# Patient Record
(demographics unavailable — no encounter records)

---

## 2025-07-23 NOTE — HISTORY OF PRESENT ILLNESS
[de-identified] : Miguel Berry is a 18 yo male who presents for evaluation of recurrent epistaxis. He notes that this is bilateral and frequent. He notes that each episode lasts for about 5 minutes and stops with pressure. He denies headaches or lightheadedness. He denies recent nasal trauma.  He also notes history of seasonal allergies treated with nasal sprays and oral antihistamine. He is followed by allergy and is on immunotherapy. He denies sinus pressure, rhinorrhea, postnasal drainage. However, he notes recurrent URIs.

## 2025-07-23 NOTE — PHYSICAL EXAM
[Exposed Vessel] : left anterior vessel exposed [2+] : 2+ [Clear to Auscultation] : lungs were clear to auscultation bilaterally [Wheezing] : no wheezing [Increased Work of Breathing] : no increased work of breathing with use of accessory muscles and retractions [Normal Gait and Station] : normal gait and station [Normal muscle strength, symmetry and tone of facial, head and neck musculature] : normal muscle strength, symmetry and tone of facial, head and neck musculature [Normal] : no cervical lymphadenopathy [Age Appropriate Behavior] : age appropriate behavior [Cooperative] : cooperative

## 2025-07-23 NOTE — ASSESSMENT
[FreeTextEntry1] : Miguel Berry presents for evaluation of recurrent epistaxis. HE has history of chronic rhinitis, currently on immunotherapy, and recurrent viral URIs. Sinonasal endoscopy was performed showing bilateral exposed anterior septal vessels. These were cauterized with silver nitrate. Epistaxis precautions were provided. Will refer to immunology for workup given recurrent viral URI.  Prevention of epistaxis: 1. The goal is to maintain good hydration of the lining of the nose. Dryness inside the nose is a major cause of bleeding. 2. Use 2-3 sprays of nasal saline to both sides of nose several times daily. 3. Apply small amount of antibiotic ointment to lining of the front of nose daily. 4. Avoid the use of ceiling fans or blowing air which can dry out your nose. 5. Consider use of a humidifier in the bedroom. 6. Avoid vigorous nose blowing. Cough and sneeze with your mouth open. 7. Your doctor may recommend stopping medicated nasal sprays for allergy or sinusitis, as these can sometimes worsen nosebleeds. 8. Maintain good control of your blood pressure. 9. If you are taking blood thinning medications, maintain careful follow-up with your prescribing physician to make sure these are properly dosed.  What to do if you have a nosebleed: 1. Use 2-3 sprays of topical nasal decongestant (ex: oxymetazoline, Afrin) on each side of the nose. 2. Hold firm pressure to soft part of nose for at least 5 minutes. Repeat as needed. 3. If bleeding is severe or does not resolve with these directions, seek immediate medical care.   - f/u prn

## 2025-07-23 NOTE — REVIEW OF SYSTEMS
[Seasonal Allergies] : seasonal allergies [Nasal Congestion] : nasal congestion [Nose Bleeds] : nose bleeds [Negative] : Heme/Lymph [de-identified] : Dry nose